# Patient Record
Sex: FEMALE | Race: WHITE | Employment: OTHER | URBAN - METROPOLITAN AREA
[De-identification: names, ages, dates, MRNs, and addresses within clinical notes are randomized per-mention and may not be internally consistent; named-entity substitution may affect disease eponyms.]

---

## 2017-01-19 PROBLEM — H40.003 GLAUCOMA SUSPECT, BILATERAL: Status: ACTIVE | Noted: 2017-01-19

## 2017-01-19 PROBLEM — Z83.511 FAMILY HISTORY OF GLAUCOMA IN MOTHER: Status: ACTIVE | Noted: 2017-01-19

## 2017-01-25 PROBLEM — R73.09 ELEVATED GLUCOSE: Status: ACTIVE | Noted: 2017-01-25

## 2017-03-23 PROBLEM — M54.41 ACUTE BACK PAIN WITH SCIATICA, RIGHT: Status: ACTIVE | Noted: 2017-03-23

## 2020-03-06 PROBLEM — H93.299 ABNORMAL AUDITORY PERCEPTION, UNSPECIFIED LATERALITY: Status: ACTIVE | Noted: 2020-03-06

## 2020-03-06 PROBLEM — H69.83 EUSTACHIAN TUBE DYSFUNCTION, BILATERAL: Status: ACTIVE | Noted: 2020-03-06

## 2021-04-01 PROBLEM — M25.562 CHRONIC PAIN OF LEFT KNEE: Status: ACTIVE | Noted: 2021-04-01

## 2021-04-01 PROBLEM — G89.29 CHRONIC PAIN OF LEFT KNEE: Status: ACTIVE | Noted: 2021-04-01

## 2021-04-01 PROBLEM — M25.562 PATELLOFEMORAL ARTHRALGIA OF LEFT KNEE: Status: ACTIVE | Noted: 2021-04-01

## 2023-01-21 ENCOUNTER — LAB ENCOUNTER (OUTPATIENT)
Dept: LAB | Age: 73
End: 2023-01-21
Attending: OBSTETRICS & GYNECOLOGY
Payer: MEDICARE

## 2023-01-21 DIAGNOSIS — Z01.818 PREOP TESTING: ICD-10-CM

## 2023-01-21 LAB
ANTIBODY SCREEN: NEGATIVE
RH BLOOD TYPE: POSITIVE
RH BLOOD TYPE: POSITIVE

## 2023-01-21 PROCEDURE — 86900 BLOOD TYPING SEROLOGIC ABO: CPT

## 2023-01-21 PROCEDURE — 86850 RBC ANTIBODY SCREEN: CPT

## 2023-01-21 PROCEDURE — 86901 BLOOD TYPING SEROLOGIC RH(D): CPT

## 2023-01-22 LAB — SARS-COV-2 RNA RESP QL NAA+PROBE: NOT DETECTED

## 2023-01-24 ENCOUNTER — ANESTHESIA EVENT (OUTPATIENT)
Dept: SURGERY | Facility: HOSPITAL | Age: 73
End: 2023-01-24
Payer: MEDICARE

## 2023-01-24 ENCOUNTER — HOSPITAL ENCOUNTER (INPATIENT)
Facility: HOSPITAL | Age: 73
LOS: 1 days | Discharge: HOME | DRG: 740 | End: 2023-01-25
Attending: OBSTETRICS & GYNECOLOGY | Admitting: OBSTETRICS & GYNECOLOGY
Payer: MEDICARE

## 2023-01-24 ENCOUNTER — HOSPITAL ENCOUNTER (INPATIENT)
Facility: HOSPITAL | Age: 73
LOS: 1 days | Discharge: HOME | End: 2023-01-25
Attending: OBSTETRICS & GYNECOLOGY | Admitting: OBSTETRICS & GYNECOLOGY
Payer: MEDICARE

## 2023-01-24 ENCOUNTER — ANESTHESIA (OUTPATIENT)
Dept: SURGERY | Facility: HOSPITAL | Age: 73
End: 2023-01-24
Payer: MEDICARE

## 2023-01-24 DIAGNOSIS — Z01.818 PREOP TESTING: Primary | ICD-10-CM

## 2023-01-24 PROBLEM — C54.1 ENDOMETRIAL CANCER (HCC): Status: ACTIVE | Noted: 2023-01-24

## 2023-01-24 PROCEDURE — 0DBW4ZZ EXCISION OF PERITONEUM, PERCUTANEOUS ENDOSCOPIC APPROACH: ICD-10-PCS | Performed by: OBSTETRICS & GYNECOLOGY

## 2023-01-24 PROCEDURE — 07BC4ZZ EXCISION OF PELVIS LYMPHATIC, PERCUTANEOUS ENDOSCOPIC APPROACH: ICD-10-PCS | Performed by: OBSTETRICS & GYNECOLOGY

## 2023-01-24 PROCEDURE — 0UT2FZZ RESECTION OF BILATERAL OVARIES, VIA NATURAL OR ARTIFICIAL OPENING WITH PERCUTANEOUS ENDOSCOPIC ASSISTANCE: ICD-10-PCS | Performed by: OBSTETRICS & GYNECOLOGY

## 2023-01-24 PROCEDURE — 0DBU4ZZ EXCISION OF OMENTUM, PERCUTANEOUS ENDOSCOPIC APPROACH: ICD-10-PCS | Performed by: OBSTETRICS & GYNECOLOGY

## 2023-01-24 PROCEDURE — 8E0W4CZ ROBOTIC ASSISTED PROCEDURE OF TRUNK REGION, PERCUTANEOUS ENDOSCOPIC APPROACH: ICD-10-PCS | Performed by: OBSTETRICS & GYNECOLOGY

## 2023-01-24 PROCEDURE — 88341 IMHCHEM/IMCYTCHM EA ADD ANTB: CPT | Performed by: OBSTETRICS & GYNECOLOGY

## 2023-01-24 PROCEDURE — 88309 TISSUE EXAM BY PATHOLOGIST: CPT | Performed by: OBSTETRICS & GYNECOLOGY

## 2023-01-24 PROCEDURE — 88112 CYTOPATH CELL ENHANCE TECH: CPT | Performed by: OBSTETRICS & GYNECOLOGY

## 2023-01-24 PROCEDURE — 88305 TISSUE EXAM BY PATHOLOGIST: CPT | Performed by: OBSTETRICS & GYNECOLOGY

## 2023-01-24 PROCEDURE — 0UT9FZZ RESECTION OF UTERUS, VIA NATURAL OR ARTIFICIAL OPENING WITH PERCUTANEOUS ENDOSCOPIC ASSISTANCE: ICD-10-PCS | Performed by: OBSTETRICS & GYNECOLOGY

## 2023-01-24 PROCEDURE — 88342 IMHCHEM/IMCYTCHM 1ST ANTB: CPT | Performed by: OBSTETRICS & GYNECOLOGY

## 2023-01-24 PROCEDURE — 07BD4ZZ EXCISION OF AORTIC LYMPHATIC, PERCUTANEOUS ENDOSCOPIC APPROACH: ICD-10-PCS | Performed by: OBSTETRICS & GYNECOLOGY

## 2023-01-24 PROCEDURE — 0UT7FZZ RESECTION OF BILATERAL FALLOPIAN TUBES, VIA NATURAL OR ARTIFICIAL OPENING WITH PERCUTANEOUS ENDOSCOPIC ASSISTANCE: ICD-10-PCS | Performed by: OBSTETRICS & GYNECOLOGY

## 2023-01-24 PROCEDURE — 88307 TISSUE EXAM BY PATHOLOGIST: CPT | Performed by: OBSTETRICS & GYNECOLOGY

## 2023-01-24 PROCEDURE — 0UQG7ZZ REPAIR VAGINA, VIA NATURAL OR ARTIFICIAL OPENING: ICD-10-PCS | Performed by: OBSTETRICS & GYNECOLOGY

## 2023-01-24 RX ORDER — DOCUSATE SODIUM 100 MG/1
100 CAPSULE, LIQUID FILLED ORAL 2 TIMES DAILY
Status: DISCONTINUED | OUTPATIENT
Start: 2023-01-25 | End: 2023-01-25

## 2023-01-24 RX ORDER — ONDANSETRON 4 MG/1
4 TABLET, FILM COATED ORAL EVERY 8 HOURS PRN
Status: DISCONTINUED | OUTPATIENT
Start: 2023-01-24 | End: 2023-01-25

## 2023-01-24 RX ORDER — BUPIVACAINE HYDROCHLORIDE 2.5 MG/ML
INJECTION, SOLUTION EPIDURAL; INFILTRATION; INTRACAUDAL AS NEEDED
Status: DISCONTINUED | OUTPATIENT
Start: 2023-01-24 | End: 2023-01-24 | Stop reason: HOSPADM

## 2023-01-24 RX ORDER — KETOROLAC TROMETHAMINE 15 MG/ML
15 INJECTION, SOLUTION INTRAMUSCULAR; INTRAVENOUS EVERY 6 HOURS PRN
Status: DISCONTINUED | OUTPATIENT
Start: 2023-01-24 | End: 2023-01-25

## 2023-01-24 RX ORDER — ROCURONIUM BROMIDE 10 MG/ML
INJECTION, SOLUTION INTRAVENOUS AS NEEDED
Status: DISCONTINUED | OUTPATIENT
Start: 2023-01-24 | End: 2023-01-24 | Stop reason: SURG

## 2023-01-24 RX ORDER — DEXAMETHASONE SODIUM PHOSPHATE 4 MG/ML
VIAL (ML) INJECTION AS NEEDED
Status: DISCONTINUED | OUTPATIENT
Start: 2023-01-24 | End: 2023-01-24 | Stop reason: SURG

## 2023-01-24 RX ORDER — SODIUM CHLORIDE, SODIUM LACTATE, POTASSIUM CHLORIDE, CALCIUM CHLORIDE 600; 310; 30; 20 MG/100ML; MG/100ML; MG/100ML; MG/100ML
INJECTION, SOLUTION INTRAVENOUS CONTINUOUS
Status: DISCONTINUED | OUTPATIENT
Start: 2023-01-24 | End: 2023-01-25

## 2023-01-24 RX ORDER — HYDROMORPHONE HYDROCHLORIDE 1 MG/ML
0.2 INJECTION, SOLUTION INTRAMUSCULAR; INTRAVENOUS; SUBCUTANEOUS EVERY 5 MIN PRN
Status: DISCONTINUED | OUTPATIENT
Start: 2023-01-24 | End: 2023-01-24 | Stop reason: HOSPADM

## 2023-01-24 RX ORDER — ONDANSETRON 2 MG/ML
INJECTION INTRAMUSCULAR; INTRAVENOUS AS NEEDED
Status: DISCONTINUED | OUTPATIENT
Start: 2023-01-24 | End: 2023-01-24 | Stop reason: SURG

## 2023-01-24 RX ORDER — SODIUM CHLORIDE, SODIUM LACTATE, POTASSIUM CHLORIDE, CALCIUM CHLORIDE 600; 310; 30; 20 MG/100ML; MG/100ML; MG/100ML; MG/100ML
INJECTION, SOLUTION INTRAVENOUS CONTINUOUS
Status: DISCONTINUED | OUTPATIENT
Start: 2023-01-24 | End: 2023-01-24 | Stop reason: HOSPADM

## 2023-01-24 RX ORDER — ENOXAPARIN SODIUM 100 MG/ML
40 INJECTION SUBCUTANEOUS DAILY
Status: DISCONTINUED | OUTPATIENT
Start: 2023-01-25 | End: 2023-01-25

## 2023-01-24 RX ORDER — MORPHINE SULFATE 4 MG/ML
4 INJECTION, SOLUTION INTRAMUSCULAR; INTRAVENOUS EVERY 10 MIN PRN
Status: DISCONTINUED | OUTPATIENT
Start: 2023-01-24 | End: 2023-01-24 | Stop reason: HOSPADM

## 2023-01-24 RX ORDER — NALOXONE HYDROCHLORIDE 0.4 MG/ML
80 INJECTION, SOLUTION INTRAMUSCULAR; INTRAVENOUS; SUBCUTANEOUS AS NEEDED
Status: DISCONTINUED | OUTPATIENT
Start: 2023-01-24 | End: 2023-01-24 | Stop reason: HOSPADM

## 2023-01-24 RX ORDER — IBUPROFEN 600 MG/1
600 TABLET ORAL EVERY 6 HOURS PRN
Status: DISCONTINUED | OUTPATIENT
Start: 2023-01-24 | End: 2023-01-25

## 2023-01-24 RX ORDER — ACETAMINOPHEN 500 MG
1000 TABLET ORAL ONCE
Status: COMPLETED | OUTPATIENT
Start: 2023-01-24 | End: 2023-01-24

## 2023-01-24 RX ORDER — LIDOCAINE HYDROCHLORIDE 10 MG/ML
INJECTION, SOLUTION EPIDURAL; INFILTRATION; INTRACAUDAL; PERINEURAL AS NEEDED
Status: DISCONTINUED | OUTPATIENT
Start: 2023-01-24 | End: 2023-01-24 | Stop reason: SURG

## 2023-01-24 RX ORDER — DIPHENHYDRAMINE HYDROCHLORIDE 50 MG/ML
12.5 INJECTION INTRAMUSCULAR; INTRAVENOUS EVERY 4 HOURS PRN
Status: DISCONTINUED | OUTPATIENT
Start: 2023-01-24 | End: 2023-01-25

## 2023-01-24 RX ORDER — HYDROMORPHONE HYDROCHLORIDE 1 MG/ML
0.6 INJECTION, SOLUTION INTRAMUSCULAR; INTRAVENOUS; SUBCUTANEOUS EVERY 5 MIN PRN
Status: DISCONTINUED | OUTPATIENT
Start: 2023-01-24 | End: 2023-01-24 | Stop reason: HOSPADM

## 2023-01-24 RX ORDER — HYDROMORPHONE HYDROCHLORIDE 1 MG/ML
0.4 INJECTION, SOLUTION INTRAMUSCULAR; INTRAVENOUS; SUBCUTANEOUS EVERY 5 MIN PRN
Status: DISCONTINUED | OUTPATIENT
Start: 2023-01-24 | End: 2023-01-24 | Stop reason: HOSPADM

## 2023-01-24 RX ORDER — HYDROMORPHONE HYDROCHLORIDE 1 MG/ML
0.4 INJECTION, SOLUTION INTRAMUSCULAR; INTRAVENOUS; SUBCUTANEOUS EVERY 2 HOUR PRN
Status: DISCONTINUED | OUTPATIENT
Start: 2023-01-24 | End: 2023-01-25

## 2023-01-24 RX ORDER — ACETAMINOPHEN 325 MG/1
650 TABLET ORAL EVERY 4 HOURS PRN
Status: DISCONTINUED | OUTPATIENT
Start: 2023-01-24 | End: 2023-01-25

## 2023-01-24 RX ORDER — SODIUM CHLORIDE 9 MG/ML
INJECTION, SOLUTION INTRAVENOUS CONTINUOUS PRN
Status: DISCONTINUED | OUTPATIENT
Start: 2023-01-24 | End: 2023-01-24 | Stop reason: SURG

## 2023-01-24 RX ORDER — ONDANSETRON 2 MG/ML
4 INJECTION INTRAMUSCULAR; INTRAVENOUS EVERY 6 HOURS PRN
Status: DISCONTINUED | OUTPATIENT
Start: 2023-01-24 | End: 2023-01-24 | Stop reason: HOSPADM

## 2023-01-24 RX ORDER — GLYCOPYRROLATE 0.2 MG/ML
INJECTION, SOLUTION INTRAMUSCULAR; INTRAVENOUS AS NEEDED
Status: DISCONTINUED | OUTPATIENT
Start: 2023-01-24 | End: 2023-01-24 | Stop reason: SURG

## 2023-01-24 RX ORDER — HYDROCODONE BITARTRATE AND ACETAMINOPHEN 5; 325 MG/1; MG/1
1 TABLET ORAL EVERY 6 HOURS PRN
Status: DISCONTINUED | OUTPATIENT
Start: 2023-01-24 | End: 2023-01-25

## 2023-01-24 RX ORDER — ATORVASTATIN CALCIUM 10 MG/1
10 TABLET, FILM COATED ORAL NIGHTLY
Status: DISCONTINUED | OUTPATIENT
Start: 2023-01-24 | End: 2023-01-25

## 2023-01-24 RX ORDER — NEOSTIGMINE METHYLSULFATE 1 MG/ML
INJECTION, SOLUTION INTRAVENOUS AS NEEDED
Status: DISCONTINUED | OUTPATIENT
Start: 2023-01-24 | End: 2023-01-24 | Stop reason: SURG

## 2023-01-24 RX ORDER — MORPHINE SULFATE 10 MG/ML
6 INJECTION, SOLUTION INTRAMUSCULAR; INTRAVENOUS EVERY 10 MIN PRN
Status: DISCONTINUED | OUTPATIENT
Start: 2023-01-24 | End: 2023-01-24 | Stop reason: HOSPADM

## 2023-01-24 RX ORDER — HYDROMORPHONE HYDROCHLORIDE 1 MG/ML
0.2 INJECTION, SOLUTION INTRAMUSCULAR; INTRAVENOUS; SUBCUTANEOUS EVERY 2 HOUR PRN
Status: DISCONTINUED | OUTPATIENT
Start: 2023-01-24 | End: 2023-01-25

## 2023-01-24 RX ORDER — AMLODIPINE BESYLATE 5 MG/1
5 TABLET ORAL NIGHTLY
Status: DISCONTINUED | OUTPATIENT
Start: 2023-01-24 | End: 2023-01-25

## 2023-01-24 RX ORDER — CEFAZOLIN SODIUM/WATER 2 G/20 ML
2 SYRINGE (ML) INTRAVENOUS ONCE
Status: COMPLETED | OUTPATIENT
Start: 2023-01-24 | End: 2023-01-24

## 2023-01-24 RX ORDER — HYDROMORPHONE HYDROCHLORIDE 1 MG/ML
0.8 INJECTION, SOLUTION INTRAMUSCULAR; INTRAVENOUS; SUBCUTANEOUS EVERY 2 HOUR PRN
Status: DISCONTINUED | OUTPATIENT
Start: 2023-01-24 | End: 2023-01-25

## 2023-01-24 RX ORDER — ONDANSETRON 2 MG/ML
4 INJECTION INTRAMUSCULAR; INTRAVENOUS EVERY 8 HOURS PRN
Status: DISCONTINUED | OUTPATIENT
Start: 2023-01-24 | End: 2023-01-25

## 2023-01-24 RX ORDER — SIMETHICONE 80 MG
80 TABLET,CHEWABLE ORAL
Status: DISCONTINUED | OUTPATIENT
Start: 2023-01-24 | End: 2023-01-25

## 2023-01-24 RX ORDER — MORPHINE SULFATE 4 MG/ML
2 INJECTION, SOLUTION INTRAMUSCULAR; INTRAVENOUS EVERY 10 MIN PRN
Status: DISCONTINUED | OUTPATIENT
Start: 2023-01-24 | End: 2023-01-24 | Stop reason: HOSPADM

## 2023-01-24 RX ORDER — INDOCYANINE GREEN AND WATER 25 MG
KIT INJECTION AS NEEDED
Status: DISCONTINUED | OUTPATIENT
Start: 2023-01-24 | End: 2023-01-24 | Stop reason: HOSPADM

## 2023-01-24 RX ORDER — METOCLOPRAMIDE HYDROCHLORIDE 5 MG/ML
10 INJECTION INTRAMUSCULAR; INTRAVENOUS EVERY 8 HOURS PRN
Status: DISCONTINUED | OUTPATIENT
Start: 2023-01-24 | End: 2023-01-24 | Stop reason: HOSPADM

## 2023-01-24 RX ORDER — CEFAZOLIN SODIUM/WATER 2 G/20 ML
2 SYRINGE (ML) INTRAVENOUS EVERY 8 HOURS
Status: COMPLETED | OUTPATIENT
Start: 2023-01-24 | End: 2023-01-25

## 2023-01-24 RX ADMIN — NEOSTIGMINE METHYLSULFATE 4 MG: 1 INJECTION, SOLUTION INTRAVENOUS at 13:22:00

## 2023-01-24 RX ADMIN — SODIUM CHLORIDE: 9 INJECTION, SOLUTION INTRAVENOUS at 13:38:00

## 2023-01-24 RX ADMIN — CEFAZOLIN SODIUM/WATER 2 G: 2 G/20 ML SYRINGE (ML) INTRAVENOUS at 11:25:00

## 2023-01-24 RX ADMIN — SODIUM CHLORIDE: 9 INJECTION, SOLUTION INTRAVENOUS at 11:15:00

## 2023-01-24 RX ADMIN — SODIUM CHLORIDE, SODIUM LACTATE, POTASSIUM CHLORIDE, CALCIUM CHLORIDE: 600; 310; 30; 20 INJECTION, SOLUTION INTRAVENOUS at 13:38:00

## 2023-01-24 RX ADMIN — ROCURONIUM BROMIDE 10 MG: 10 INJECTION, SOLUTION INTRAVENOUS at 12:05:00

## 2023-01-24 RX ADMIN — ROCURONIUM BROMIDE 10 MG: 10 INJECTION, SOLUTION INTRAVENOUS at 11:34:00

## 2023-01-24 RX ADMIN — LIDOCAINE HYDROCHLORIDE 50 MG: 10 INJECTION, SOLUTION EPIDURAL; INFILTRATION; INTRACAUDAL; PERINEURAL at 11:08:00

## 2023-01-24 RX ADMIN — ROCURONIUM BROMIDE 50 MG: 10 INJECTION, SOLUTION INTRAVENOUS at 11:10:00

## 2023-01-24 RX ADMIN — ONDANSETRON 4 MG: 2 INJECTION INTRAMUSCULAR; INTRAVENOUS at 11:08:00

## 2023-01-24 RX ADMIN — DEXAMETHASONE SODIUM PHOSPHATE 8 MG: 4 MG/ML VIAL (ML) INJECTION at 11:08:00

## 2023-01-24 RX ADMIN — GLYCOPYRROLATE 0.8 MG: 0.2 INJECTION, SOLUTION INTRAMUSCULAR; INTRAVENOUS at 13:22:00

## 2023-01-24 NOTE — BRIEF OP NOTE
Pre-Operative Diagnosis: Endometrial cancer postmenopausal bleeding     Post-Operative Diagnosis: Endometrial cancer postmenopausal bleeding      Procedure Performed:   Robotic assisted total laparoscopic hysterectomy, bilateral salpingo oophorectomy staging, omentectomy    Surgeon(s) and Role:     * Pamela Mohr MD - Primary    Assistant(s):  Surgical Assistant.: Maciel Woodward CSA  PA: Annalee Fair, PA     Surgical Findings: tumor attached to right ureter, see full op report     Specimen: sent to pathology     Estimated Blood Loss: Blood Output: 50 mL (1/24/2023  1:04 PM)      Dictation Number:  TBD    DEAN Andino  1/24/2023  1:36 PM

## 2023-01-24 NOTE — ANESTHESIA PROCEDURE NOTES
Airway  Date/Time: 1/24/2023 11:11 AM  Urgency: Elective      General Information and Staff    Patient location during procedure: OR  Anesthesiologist: Denys Lopes MD  Resident/CRNA: Jessica Mir CRNA  Performed: CRNA     Indications and Patient Condition  Indications for airway management: anesthesia  Sedation level: deep  Preoxygenated: yes  Patient position: sniffing  Mask difficulty assessment: 2 - vent by mask + OA or adjuvant +/- NMBA    Final Airway Details  Final airway type: endotracheal airway      Successful airway: ETT  Cuffed: yes   Successful intubation technique: direct laryngoscopy  Facilitating devices/methods: intubating stylet and cricoid pressure  Endotracheal tube insertion site: oral  Blade: [place]  Blade size: #3  ETT size (mm): 7.0    Cormack-Lehane Classification: grade IIA - partial view of glottis  Placement verified by: chest auscultation and capnometry   Measured from: teeth  ETT to teeth (cm): 20  Number of attempts at approach: 1

## 2023-01-24 NOTE — PLAN OF CARE
Deion Able is A+O x 4. Vitals stable. POD 0. Tolerating clear liquid diet. IVF infusing. Pain controlled with dilaudid. Lovenox for DVT prophylaxis. Eliminating waste adequately via grewal. Bed low, locked, and all safety measures in place.      Problem: Patient Centered Care  Goal: Patient preferences are identified and integrated in the patient's plan of care  Description: Interventions:  - What would you like us to know as we care for you?   - Provide timely, complete, and accurate information to patient/family  - Incorporate patient and family knowledge, values, beliefs, and cultural backgrounds into the planning and delivery of care  - Encourage patient/family to participate in care and decision-making at the level they choose  - Honor patient and family perspectives and choices  Outcome: Progressing

## 2023-01-25 VITALS
TEMPERATURE: 98 F | BODY MASS INDEX: 23.21 KG/M2 | SYSTOLIC BLOOD PRESSURE: 139 MMHG | OXYGEN SATURATION: 94 % | WEIGHT: 131 LBS | RESPIRATION RATE: 18 BRPM | HEART RATE: 75 BPM | DIASTOLIC BLOOD PRESSURE: 64 MMHG | HEIGHT: 63 IN

## 2023-01-25 LAB
ANION GAP SERPL CALC-SCNC: 9 MMOL/L (ref 0–18)
BASOPHILS # BLD AUTO: 0.01 X10(3) UL (ref 0–0.2)
BASOPHILS NFR BLD AUTO: 0.1 %
BUN BLD-MCNC: 11 MG/DL (ref 7–18)
BUN/CREAT SERPL: 17.5 (ref 10–20)
CALCIUM BLD-MCNC: 8.6 MG/DL (ref 8.5–10.1)
CHLORIDE SERPL-SCNC: 107 MMOL/L (ref 98–112)
CO2 SERPL-SCNC: 27 MMOL/L (ref 21–32)
CREAT BLD-MCNC: 0.63 MG/DL
DEPRECATED RDW RBC AUTO: 38.7 FL (ref 35.1–46.3)
EOSINOPHIL # BLD AUTO: 0 X10(3) UL (ref 0–0.7)
EOSINOPHIL NFR BLD AUTO: 0 %
ERYTHROCYTE [DISTWIDTH] IN BLOOD BY AUTOMATED COUNT: 12.7 % (ref 11–15)
GFR SERPLBLD BASED ON 1.73 SQ M-ARVRAT: 94 ML/MIN/1.73M2 (ref 60–?)
GLUCOSE BLD-MCNC: 114 MG/DL (ref 70–99)
HCT VFR BLD AUTO: 34.1 %
HGB BLD-MCNC: 11.4 G/DL
IMM GRANULOCYTES # BLD AUTO: 0.04 X10(3) UL (ref 0–1)
IMM GRANULOCYTES NFR BLD: 0.4 %
LYMPHOCYTES # BLD AUTO: 1.24 X10(3) UL (ref 1–4)
LYMPHOCYTES NFR BLD AUTO: 11.5 %
MCH RBC QN AUTO: 28.4 PG (ref 26–34)
MCHC RBC AUTO-ENTMCNC: 33.4 G/DL (ref 31–37)
MCV RBC AUTO: 84.8 FL
MONOCYTES # BLD AUTO: 0.75 X10(3) UL (ref 0.1–1)
MONOCYTES NFR BLD AUTO: 7 %
NEUTROPHILS # BLD AUTO: 8.73 X10 (3) UL (ref 1.5–7.7)
NEUTROPHILS # BLD AUTO: 8.73 X10(3) UL (ref 1.5–7.7)
NEUTROPHILS NFR BLD AUTO: 81 %
OSMOLALITY SERPL CALC.SUM OF ELEC: 296 MOSM/KG (ref 275–295)
PLATELET # BLD AUTO: 286 10(3)UL (ref 150–450)
POTASSIUM SERPL-SCNC: 3.5 MMOL/L (ref 3.5–5.1)
RBC # BLD AUTO: 4.02 X10(6)UL
SODIUM SERPL-SCNC: 143 MMOL/L (ref 136–145)
WBC # BLD AUTO: 10.8 X10(3) UL (ref 4–11)

## 2023-01-25 PROCEDURE — 85025 COMPLETE CBC W/AUTO DIFF WBC: CPT | Performed by: PHYSICIAN ASSISTANT

## 2023-01-25 PROCEDURE — 80048 BASIC METABOLIC PNL TOTAL CA: CPT | Performed by: PHYSICIAN ASSISTANT

## 2023-01-25 RX ORDER — SIMETHICONE 80 MG
80 TABLET,CHEWABLE ORAL 3 TIMES DAILY PRN
Qty: 60 TABLET | Refills: 0 | Status: SHIPPED | OUTPATIENT
Start: 2023-01-25 | End: 2023-01-25

## 2023-01-25 RX ORDER — HYDROCODONE BITARTRATE AND ACETAMINOPHEN 5; 325 MG/1; MG/1
1 TABLET ORAL EVERY 6 HOURS PRN
Qty: 20 TABLET | Refills: 0 | Status: SHIPPED | OUTPATIENT
Start: 2023-01-25

## 2023-01-25 RX ORDER — ACETAMINOPHEN 325 MG/1
650 TABLET ORAL EVERY 4 HOURS PRN
Qty: 20 TABLET | Refills: 0 | Status: SHIPPED | OUTPATIENT
Start: 2023-01-25

## 2023-01-25 RX ORDER — ACETAMINOPHEN 325 MG/1
650 TABLET ORAL EVERY 4 HOURS PRN
Qty: 20 TABLET | Refills: 0 | Status: SHIPPED | OUTPATIENT
Start: 2023-01-25 | End: 2023-01-25

## 2023-01-25 RX ORDER — PSEUDOEPHEDRINE HCL 30 MG
100 TABLET ORAL 2 TIMES DAILY
Qty: 60 CAPSULE | Refills: 0 | Status: SHIPPED | OUTPATIENT
Start: 2023-01-25 | End: 2023-01-25

## 2023-01-25 RX ORDER — SIMETHICONE 80 MG
80 TABLET,CHEWABLE ORAL 3 TIMES DAILY PRN
Qty: 60 TABLET | Refills: 0 | Status: SHIPPED | OUTPATIENT
Start: 2023-01-25

## 2023-01-25 RX ORDER — IBUPROFEN 600 MG/1
600 TABLET ORAL EVERY 6 HOURS PRN
Qty: 20 TABLET | Refills: 0 | Status: SHIPPED | OUTPATIENT
Start: 2023-01-25 | End: 2023-01-25

## 2023-01-25 RX ORDER — PSEUDOEPHEDRINE HCL 30 MG
100 TABLET ORAL 2 TIMES DAILY
Qty: 60 CAPSULE | Refills: 0 | Status: SHIPPED | OUTPATIENT
Start: 2023-01-25

## 2023-01-25 RX ORDER — IBUPROFEN 600 MG/1
600 TABLET ORAL EVERY 6 HOURS PRN
Qty: 20 TABLET | Refills: 0 | Status: SHIPPED | OUTPATIENT
Start: 2023-01-25

## 2023-01-25 RX ORDER — HYDROCODONE BITARTRATE AND ACETAMINOPHEN 5; 325 MG/1; MG/1
1 TABLET ORAL EVERY 6 HOURS PRN
Qty: 20 TABLET | Refills: 0 | Status: SHIPPED | OUTPATIENT
Start: 2023-01-25 | End: 2023-01-25

## 2023-01-25 NOTE — PROGRESS NOTES
01/25/23 1118   Clinical Encounter Type   Visited With Patient and family together   Routine Visit Introduction   Referral From Other (Comment)  ([facility])   Referral To    Taxonomy   Intended Effects Demonstrate caring and concern   Methods Offer emotional support; Offer spiritual/Christianity support;Encourage sharing of feelings   Interventions Acknowledge current situation; Acknowledge response to difficult experience; Active listening;Ask questions to bring forth feelings;[place];Identify supportive relationship(s)       Discussion: Pt seen bedside, sitting up in bed; alert and conversant; appeared to be in good spirits; daughter and son present.  provided emotional support and prayer per request; pt identified support system. Pt advised of  availability.  follow-up visit available prn and can be contacted at [phone].     Tara Graham Chaplain Resident

## 2023-01-25 NOTE — PLAN OF CARE
Pt A&Ox4, moderate amount of pain treated with PRN dilaudid. Incision sites clean/dry/intact. Dexter removed at 6am today. Pt reported bleching, not passing gas, and no BM. Diet advanced to low fiber this morning. No reports of n/v. Fluids infusing, abx given. Pt ambulated around halls with standby assistance. Call light within reach and bed alarm on for safety. Problem: Patient Centered Care  Goal: Patient preferences are identified and integrated in the patient's plan of care  Description: Interventions:  - What would you like us to know as we care for you?  From home   - Provide timely, complete, and accurate information to patient/family  - Incorporate patient and family knowledge, values, beliefs, and cultural backgrounds into the planning and delivery of care  - Encourage patient/family to participate in care and decision-making at the level they choose  - Honor patient and family perspectives and choices  1/25/2023 0158 by Cassidy Whitley RN  Outcome: Progressing  1/25/2023 0157 by Cassidy Whitley RN  Outcome: Progressing     Problem: Patient/Family Goals  Goal: Patient/Family Long Term Goal  Description: Patient's Long Term Goal: return home    Interventions:  - See additional Care Plan goals for specific interventions  1/25/2023 0158 by Cassidy Whitley RN  Outcome: Progressing  1/25/2023 0157 by Cassidy Whitley RN  Outcome: Progressing  Goal: Patient/Family Short Term Goal  Description: Patient's Short Term Goal: manage pain    Interventions:   - See additional Care Plan goals for specific interventions  1/25/2023 0158 by Cassidy Whitley RN  Outcome: Progressing  1/25/2023 0157 by Cassidy Whitley RN  Outcome: Progressing     Problem: PAIN - ADULT  Goal: Verbalizes/displays adequate comfort level or patient's stated pain goal  Description: INTERVENTIONS:  - Encourage pt to monitor pain and request assistance  - Assess pain using appropriate pain scale  - Administer analgesics based on type and severity of pain and evaluate response  - Implement non-pharmacological measures as appropriate and evaluate response  - Consider cultural and social influences on pain and pain management  - Manage/alleviate anxiety  - Utilize distraction and/or relaxation techniques  - Monitor for opioid side effects  - Notify MD/LIP if interventions unsuccessful or patient reports new pain  - Anticipate increased pain with activity and pre-medicate as appropriate  Outcome: Progressing     Problem: RISK FOR INFECTION - ADULT  Goal: Absence of fever/infection during anticipated neutropenic period  Description: INTERVENTIONS  - Monitor WBC  - Administer growth factors as ordered  - Implement neutropenic guidelines  Outcome: Progressing     Problem: SAFETY ADULT - FALL  Goal: Free from fall injury  Description: INTERVENTIONS:  - Assess pt frequently for physical needs  - Identify cognitive and physical deficits and behaviors that affect risk of falls.   - [place] fall precautions as indicated by assessment.  - Educate pt/family on patient safety including physical limitations  - Instruct pt to call for assistance with activity based on assessment  - Modify environment to reduce risk of injury  - Provide assistive devices as appropriate  - Consider OT/PT consult to assist with strengthening/mobility  - Encourage toileting schedule  Outcome: Progressing     Problem: SKIN/TISSUE INTEGRITY - ADULT  Goal: Incision(s), wounds(s) or drain site(s) healing without S/S of infection  Description: INTERVENTIONS:  - Assess and document risk factors for pressure ulcer development  - Assess and document skin integrity  - Assess and document dressing/incision, wound bed, drain sites and surrounding tissue  - Implement wound care per orders  - Initiate isolation precautions as appropriate  - Initiate Pressure Ulcer prevention bundle as indicated  Outcome: Progressing

## 2023-01-25 NOTE — OPERATIVE REPORT
[facility]    PATIENT'S NAME: MAINE Barrera   ATTENDING PHYSICIAN: Zeinab Noonan MD   OPERATING PHYSICIAN: Zeinab Noonan MD   PATIENT ACCOUNT#:   [id number]    LOCATION:  [address] [place] [address] Drive #:   [id number]       YOB: 1950  ADMISSION DATE:       01/24/2023      OPERATION DATE:  01/24/2023    OPERATIVE REPORT      PREOPERATIVE DIAGNOSIS:  Uterine papillary serous carcinoma. POSTOPERATIVE DIAGNOSIS:  Uterine papillary serous carcinoma. PROCEDURE:  Robotic-assisted total laparoscopic hysterectomy, bilateral salpingo-oophorectomy, bilateral cervical injection of indocyanine green dye, left sentinel lymph node dissection, bilateral pelvic and para-aortic lymph node dissection, resection of right pelvic sidewall nodule attached to right ureter (tumor debulking), partial omentectomy. ASSISTANT:  Sohail Deleon PA-C and [employer]. INTRAVENOUS FLUIDS:  1 L.    URINE OUTPUT:  300 mL. ESTIMATED BLOOD LOSS:  50 mL. DRAINS:  Dexter catheter. COMPLICATIONS:  None. CONDITION:  Stable, to recovery room. INDICATIONS:  This is a 70-year-old female who has postmenopausal bleeding, underwent endometrial sampling which showed high-grade papillary serous carcinoma. Patient did not have any significant bleeding afterward and was referred to me. She underwent  testing that was normal and had a CT scan that did not show any obvious metastatic disease. I counseled the patient the options, decision was made to proceed with surgical intervention. She was counseled on risks, benefits, alternatives, and indications. Informed consent was obtained. She underwent preoperative medical clearance, was cleared for surgery. FINDINGS:  Laparoscopic evaluation showed there were some adhesions in the cecum to the anterior abdominal wall that extended close to the midline in the area of her previous midline vertical incisions.   These adhesions appeared to be from her appendectomy. There were some adhesions in the anterior cul-de-sac from her . Both ovaries appeared normal.  The uterus had multiple nodularities on the surface consistent with most likely fibroids. The fundus and the uterus appeared to be somewhat thin and likely contained a necrotic tumor, multiple pieces of fragments of necrotic tumor were seen at the specimen  from the [facility]. There was also a 2 to 2.5 cm nodule that was attached to the right pelvic sidewall of the peritoneum and very close proximity to the course of the ureter in the mid pelvic region. [place] lymph node mapping occurred on the left side in the external iliac region, did not occur on the right side. There were no significantly enlarged lymph node. A full pelvic lymphadenectomy was performed. The omentum had no obvious disease. The upper abdomen, liver, gallbladder, stomach, all appeared normal.    OPERATIVE TECHNIQUE:  Patient was taken to the operating room. She was given general endotracheal tube intubation anesthesia. She was prepped and draped in the usual sterile fashion. Dexter catheter was inserted. The cervix was visualized, grasped with a tenaculum, serially dilated. Dilute solution of ICG dye was injected bilaterally at 3 o'clock, 9 o'clock and more superficially at 5 and 7 o'clock. A total of 5 mL were used. A medium VCare was placed under direct visualization. After pneumoperitoneum was created in left upper quadrant at Molina's point, initial pressure was 3 mmHg. Abdomen was insufflated with 4 L of CO2 gas. A 5 mm AirSeal port was placed, position was confirmed. Robotic trocars were placed 4 cm above the umbilicus in the midline, 2 on the left each 10 cm apart and 1 on the right 10 cm apart at the same level, and a 5 mm assistant port in the right lateral mid quadrant. Care was taken not to traverse the obturator through her adhesion. Pelvic washings were obtained. Patient was placed in Trendelenburg position. The bowel was positioned out of the way. Robot was docked. Standard instrumentation applied. Adhesions in the cecum to the anterior abdominal wall extending up to the midline were taken down with sharp dissection. It was noted at this time that the fundus of the uterus appeared to be somewhat thinned out and portion of the VCare was protruding through this area despite above being insufflated. Care was taken not to significantly push in on the manipulator to prevent full perforation and spillage. The round ligaments on both sides were cauterized and cut near the pelvic sidewall. The peritoneum between the round ligament and infundibulopelvic ligament was divided. Retroperitoneal spaces were opened. Ureters were identified. [place] lymph node mapping was attempted on the right side but did not map on the left side. It was seen along the external iliac region. This lymph node was excised and placed in the paracolic space. The ureters had been identified. The infundibulopelvic ligaments on both sides were cauterized and cut. The peritoneal nodule was seen in the midportion of the pelvis near the course of the ureter. I came back and excised this later in the case. At this point, the posterior and anterior broad ligaments were taken down. There was some scarring anteriorly from her previous . We were able to navigate through this and take the bladder off the cervical fascia. By finding the appropriate plane, the anterior portion of the VCare cup was seen. Posterior broad ligament was then skeletonized. Uterine vessels were skeletonized, cauterized and cut. Descending branches were similarly cauterized and cut. Parametria was taken down up to the level of the VCare cup. Posterior peritoneum over the VCare cup was also skeletonized. Colpotomy was made anteriorly, carried out circumferentially. Specimen was delivered through the vagina. There was some spillage of tumor after the VCare cup was  and we suctioned these small tumor nodules. At this point, a 35 mm Santi Clark tube was brought in transvaginally. I delivered the left sentinel lymph node through this. At this point, the right ureter was identified. Gentle ureterolysis was carried out to separate the ureter off the medial attachments to the peritoneum. I excised and debulked the tumor off the right pelvic sidewall in its entirety. It was delivered through the [place] tube. Due to non-mapping, a full pelvic lymphadenectomy was performed on the right side between the bifurcation of the left common iliac superiorly, inferiorly to deep circumflex iliac vein, laterally to the psoas, medially to the aorta, and posteriorly to the obturator nerve. Super staging was performed on the left side due to high-grade serous carcinoma and similar dissection was carried out on the left side with no complication. Peritoneum was opened over the right common iliac up to the aortocaval region, up to the level of the duodenum. Aortocaval lymph node dissection was performed up to the level of the duodenum. Right ureter, left ureter, great vessels are seen along with the inferior mesenteric artery and all of these structures were preserved. Lymph nodes were delivered through the Clark tube. Portion of the distal omentum was also excised with bipolar and monopolar abdelrahman and delivered through the Clark tube. The vagina was closed with 0 180 V-Loc suture starting from the right lateral edge and working our way to the left. Transvaginal examination showed exposure of the vagina. Clear urine was seen in the Dexter catheter. Surgicel was placed to the pelvic and para-aortic lymph node dissection area and along the vaginal cuff. Omental area was reinspected, found to be hemostatic. Robotic instruments were removed. Robot was undocked.   Camera was removed under direct visualization. AirSeal was used to deflate the abdomen. Transvaginal examination after closure of the cuff had shown intact closure. There was noted to be some distal posterior fourchette vaginal laceration that was approximately 1 to 2 cm long that was corrected with a 2-0 Vicryl sutures and came together nicely. No ongoing bleeding was seen. I was present and scrubbed for the entire procedure. Procedure took 25% longer than in general due to the additional steps required for tumor debulking of the peritoneal nodule and right ureterolysis along with partial omentectomy. Silvano Guillory PA-C, served as assistant and was invaluable in providing traction, countertraction, exposure and was needed for the safety and efficiency of the procedure. Patient was extubated and taken to recovery room in satisfactory condition.     Dictated By Diana Rico MD  d: 01/24/2023 14:10:33  t: 01/24/2023 21:09:46  Job [id number]  /    cc: Diana Rico MD

## 2023-01-26 NOTE — PLAN OF CARE
Rowdy Delvin is A+O x 4. Vitals stable. Tolerating low fiber/soft diet. Ambulating without assistance. IVF infusing. Pain controlled with norco. Lovenox for DVT prophylaxis. Eliminating waste adequately. Bed low, locked, and all safety measures in place. Removed IV. Discharged home today. Reviewed discharge instructions including new medications and follow up appointments. All questions answered at this time.     Problem: Patient Centered Care  Goal: Patient preferences are identified and integrated in the patient's plan of care  Description: Interventions:  - What would you like us to know as we care for you?   - Provide timely, complete, and accurate information to patient/family  - Incorporate patient and family knowledge, values, beliefs, and cultural backgrounds into the planning and delivery of care  - Encourage patient/family to participate in care and decision-making at the level they choose  - Honor patient and family perspectives and choices  Outcome: Progressing     Problem: PAIN - ADULT  Goal: Verbalizes/displays adequate comfort level or patient's stated pain goal  Description: INTERVENTIONS:  - Encourage pt to monitor pain and request assistance  - Assess pain using appropriate pain scale  - Administer analgesics based on type and severity of pain and evaluate response  - Implement non-pharmacological measures as appropriate and evaluate response  - Consider cultural and social influences on pain and pain management  - Manage/alleviate anxiety  - Utilize distraction and/or relaxation techniques  - Monitor for opioid side effects  - Notify MD/LIP if interventions unsuccessful or patient reports new pain  - Anticipate increased pain with activity and pre-medicate as appropriate  Outcome: Progressing     Problem: RISK FOR INFECTION - ADULT  Goal: Absence of fever/infection during anticipated neutropenic period  Description: INTERVENTIONS  - Monitor WBC  - Administer growth factors as ordered  - Implement neutropenic guidelines  Outcome: Progressing     Problem: SAFETY ADULT - FALL  Goal: Free from fall injury  Description: INTERVENTIONS:  - Assess pt frequently for physical needs  - Identify cognitive and physical deficits and behaviors that affect risk of falls.   - [place] fall precautions as indicated by assessment.  - Educate pt/family on patient safety including physical limitations  - Instruct pt to call for assistance with activity based on assessment  - Modify environment to reduce risk of injury  - Provide assistive devices as appropriate  - Consider OT/PT consult to assist with strengthening/mobility  - Encourage toileting schedule  Outcome: Progressing     Problem: SKIN/TISSUE INTEGRITY - ADULT  Goal: Incision(s), wounds(s) or drain site(s) healing without S/S of infection  Description: INTERVENTIONS:  - Assess and document risk factors for pressure ulcer development  - Assess and document skin integrity  - Assess and document dressing/incision, wound bed, drain sites and surrounding tissue  - Implement wound care per orders  - Initiate isolation precautions as appropriate  - Initiate Pressure Ulcer prevention bundle as indicated  Outcome: Progressing

## 2023-01-26 NOTE — DISCHARGE INSTRUCTIONS
Sample Pain Management Regimen  The following is only a sample timetable. After the first 3 days, narcotic should be minimal or none. Please instead use Ibuprofen and Acetaminophen. The First 3 to 4 days at Home  Use Ice cold compresses   Alternate the narcotic with ibuprofen every 3 hours. Adjust the amount and timing    6AM        Ibuprofen   200mg (2-3 capsules) = 400-600mg   9AM        Narcotic Prescription (Norco)   Noon   Ibuprofen   200mg (2-3 capsules) =400-600mg  3PM          Narcotic Prescription (Norco)   6PM          Ibuprofen   200mg (2-3 capsules) = 400-600mg   9PM          Narcotic Prescription (Norco)    After the first 3 to 4 days at Home   Use Ice cold compresses as needed   Please limit the use of narcotics.    Adjust the amount and timing of Ibuprofen     6AM       Ibuprofen   200mg (2-3 capsules) = 400-600mg  9AM       Acetaminophen  Extra strength 1000mg (2 tablets)  Noon     Ibuprofen   200mg (2-3 capsules) = 400-600mg   3PM       Acetaminophen  Extra strength 1000mg (2 tablets)   6PM       Ibuprofen   200mg (2-3 capsules) = 400-600mg   9PM       Narcotic Prescription (Norco)

## (undated) DEVICE — BLADELESS OBTURATOR: Brand: WECK VISTA

## (undated) DEVICE — 3M™ IOBAN™ 2 ANTIMICROBIAL INCISE DRAPE 6650EZ: Brand: IOBAN™ 2

## (undated) DEVICE — SOL NACL IRRIG 0.9% 1000ML BTL

## (undated) DEVICE — MONOPOLAR CURVED SCISSORS: Brand: ENDOWRIST

## (undated) DEVICE — DRAPE SHEET LAVH 124X112X30

## (undated) DEVICE — CLOSURE EXOFIN 1.0ML

## (undated) DEVICE — SUT VICRYL 2-0 SH J417H

## (undated) DEVICE — SYSTEM SURGYPAD VELCRO 36IN

## (undated) DEVICE — SUCTION CANISTER, 3000CC,SAFELINER: Brand: DEROYAL

## (undated) DEVICE — GAMMEX® PI HYBRID SIZE 8, STERILE POWDER-FREE SURGICAL GLOVE, POLYISOPRENE AND NEOPRENE BLEND: Brand: GAMMEX

## (undated) DEVICE — PROGRASP FORCEPS: Brand: ENDOWRIST

## (undated) DEVICE — LAPAROVUE VISIBILITY SYSTEM LAPAROSCOPIC SOLUTIONS: Brand: LAPAROVUE

## (undated) DEVICE — TRI-LUMEN FILTERED TUBE SET WITH ACTIVATED CHARCOAL FILTER: Brand: AIRSEAL

## (undated) DEVICE — TROCAR: Brand: KII FIOS FIRST ENTRY

## (undated) DEVICE — TUBE SUCTION COLPOTOMIZOR 35MM

## (undated) DEVICE — INSUFFLATION NEEDLE TO ESTABLISH PNEUMOPERITONEUM.: Brand: INSUFFLATION NEEDLE

## (undated) DEVICE — SKIN PREP TRAY 4 COMPARTM TRAY: Brand: MEDLINE INDUSTRIES, INC.

## (undated) DEVICE — FENESTRATED BIPOLAR FORCEPS: Brand: ENDOWRIST

## (undated) DEVICE — GAMMEX® PI HYBRID SIZE 7.5, STERILE POWDER-FREE SURGICAL GLOVE, POLYISOPRENE AND NEOPRENE BLEND: Brand: GAMMEX

## (undated) DEVICE — SOL  0.9% 1000ML

## (undated) DEVICE — COLUMN DRAPE

## (undated) DEVICE — SUT MONOCRYL 4-0 PS-2 Y496G

## (undated) DEVICE — CANNULA SEAL

## (undated) DEVICE — ARM DRAPE

## (undated) DEVICE — MEGA NEEDLE DRIVER: Brand: ENDOWRIST

## (undated) DEVICE — AIRSEAL 5 MM ACCESS PORT AND LOW PROFILE OBTURATOR WITH BLADELESS OPTICAL TIP, 120 MM LENGTH: Brand: AIRSEAL

## (undated) DEVICE — ROBOTIC: Brand: MEDLINE INDUSTRIES, INC.

## (undated) DEVICE — CONTAINER GENT-L-KARE 4OZ GRAY

## (undated) DEVICE — ELECTRO LUBE IS A SINGLE PATIENT USE DEVICE THAT IS INTENDED TO BE USED ON ELECTROSURGICAL ELECTRODES TO REDUCE STICKING.: Brand: KEY SURGICAL ELECTRO LUBE

## (undated) DEVICE — TIP COVER ACCESSORY

## (undated) DEVICE — TRAP MCS 40ML 5IN PLS SCR CAP

## (undated) DEVICE — ABSORBABLE HEMOSTAT (OXIDIZED REGENERATED CELLULOSE, U.S.P.): Brand: SURGICEL

## (undated) DEVICE — SUTURE VLOC 180 0 12" 0316

## (undated) DEVICE — VCARE MEDIUM, UTERINE MANIPULATOR, VAGINAL-CERVICAL-AHLUWALIA'S-RETRACTOR-ELEVATOR: Brand: VCARE

## (undated) DEVICE — STERILE SURGICAL LUBRICANT, METAL TUBE: Brand: SURGILUBE